# Patient Record
Sex: MALE | Race: WHITE | NOT HISPANIC OR LATINO | Employment: PART TIME
[De-identification: names, ages, dates, MRNs, and addresses within clinical notes are randomized per-mention and may not be internally consistent; named-entity substitution may affect disease eponyms.]

---

## 2021-08-08 ENCOUNTER — HEALTH MAINTENANCE LETTER (OUTPATIENT)
Age: 36
End: 2021-08-08

## 2021-10-04 ENCOUNTER — HEALTH MAINTENANCE LETTER (OUTPATIENT)
Age: 36
End: 2021-10-04

## 2021-11-03 ENCOUNTER — OFFICE VISIT (OUTPATIENT)
Dept: FAMILY MEDICINE | Facility: OTHER | Age: 36
End: 2021-11-03
Attending: NURSE PRACTITIONER
Payer: COMMERCIAL

## 2021-11-03 VITALS
DIASTOLIC BLOOD PRESSURE: 82 MMHG | RESPIRATION RATE: 18 BRPM | SYSTOLIC BLOOD PRESSURE: 122 MMHG | HEART RATE: 106 BPM | OXYGEN SATURATION: 97 % | HEIGHT: 67 IN | WEIGHT: 205.3 LBS | BODY MASS INDEX: 32.22 KG/M2 | TEMPERATURE: 98.4 F

## 2021-11-03 DIAGNOSIS — R42 VERTIGO: ICD-10-CM

## 2021-11-03 DIAGNOSIS — J34.89 NASAL PAIN: Primary | ICD-10-CM

## 2021-11-03 PROCEDURE — G0463 HOSPITAL OUTPT CLINIC VISIT: HCPCS

## 2021-11-03 PROCEDURE — 99203 OFFICE O/P NEW LOW 30 MIN: CPT | Performed by: NURSE PRACTITIONER

## 2021-11-03 RX ORDER — FLUOXETINE 40 MG/1
40 CAPSULE ORAL DAILY
COMMUNITY

## 2021-11-03 ASSESSMENT — MIFFLIN-ST. JEOR: SCORE: 1829.86

## 2021-11-03 ASSESSMENT — PAIN SCALES - GENERAL: PAINLEVEL: NO PAIN (0)

## 2021-11-03 NOTE — PROGRESS NOTES
ASSESSMENT/PLAN:     I have reviewed the nursing notes.  I have reviewed the findings, diagnosis, plan and need for follow up with the patient.      1. Nasal pain  Discussed with patient his symptoms are likely due to inflammation and dryness in his nares.  No clinical indications of sinus infection or indications for antibiotics at this time.  Recommend symptomatic treatment.  Symptomatic treatment - Encouraged fluids, saline nasal spray, humidifier, steamy shower, etc   If symptoms do not improve with recommended sypmtomatic treatment would recommend adding steroid nasal spray and lastly if needed then add antibiotics.      2. Vertigo    Right sided positional vertigo  Appears to have been single episode    Discussed warning signs/symptoms indicative of need to f/u  Follow up if symptoms persist or worsen or concerns      I explained my diagnostic considerations and recommendations to the patient, who voiced understanding and agreement with the treatment plan. All questions were answered. We discussed potential side effects of any prescribed or recommended therapies, as well as expectations for response to treatments.    Agatha Morrow NP  [facility]      SUBJECTIVE:   Nir Cosby is a 36 year old male who presents to clinic today for the following health issues:  Sinus    HPI  Sinus pressure and pain on right side of nose x 3 to 4 days.  No pain in cheek or forehead.  Dizziness last night when laying down, noted the room spinning and he was off balance.  Resting more today.  Minimal/slight dizziness today but no vertigo symptoms.  No fevers.  Minimal nasal drainage from right nostril only.  No headaches.  No ear pain, pressure, ringing or buzzing.  No sore throat.  No cough.  No shortness of breath.  Appetite fair.  Energy decreased, sleeping a lot today.  No vision change or loss.    No previous hx of vertigo  No OTC medications        Patient Active Problem List    Diagnosis Date Noted  "    Periumbilical hernia 12/03/2015     Priority: Medium     Seizure in infant (H) 12/03/2015     Priority: Medium     Pt was preemie, \" blue baby\"       Past Medical History:   Diagnosis Date     Seizures (H)     as a baby      Past Surgical History:   Procedure Laterality Date     HERNIORRHAPHY UMBILICAL N/A 12/4/2015    Procedure: HERNIORRHAPHY UMBILICAL;  Surgeon: Mario Hughes MD;  Location:  OR     Social History     Tobacco Use     Smoking status: Never Smoker     Smokeless tobacco: Never Used   Substance Use Topics     Alcohol use: Yes     Comment: 2-3 a week     Social History     Social History Narrative     Not on file     Current Outpatient Medications   Medication Sig Dispense Refill     FLUoxetine (PROZAC) 40 MG capsule Take 40 mg by mouth daily       Allergies   Allergen Reactions     Doxycycline Itching           OBJECTIVE:     /82   Pulse 106   Temp 98.4  F (36.9  C) (Tympanic)   Resp 18   Ht 1.702 m (5' 7\")   Wt 93.1 kg (205 lb 4.8 oz)   SpO2 97%   BMI 32.15 kg/m    Body mass index is 32.15 kg/m .     Physical Exam  General Appearance: Well appearing adult male, non ill appearance, appropriate appearance for age. No acute distress  Ears: Left TM intact, translucent with bony landmarks appreciated, no erythema, no effusion, no bulging, no purulence.  Right TM intact, translucent with bony landmarks appreciated, no erythema, no effusion, no bulging, no purulence.  Left auditory canal clear.  Right auditory canal clear.  Normal external ears, non tender.  No provoked vertigo symptoms with movement.  Eyes: conjunctivae normal without erythema or irritation, corneas clear, no drainage or crusting, no eyelid swelling, pupils equal   Orophayrnx: voice clear.    Sinuses:  No sinus tenderness upon palpation of the frontal or maxillary sinuses  Nose:  Bilateral nares with erythema and dryness, thick area of dried crusted congestion, no active drainage or bleeding  Neck: supple without " adenopathy  Respiratory: normal chest wall and respirations.  Normal effort.  No cough appreciated.  Musculoskeletal:  Normal ROM of neck without stiffness or discomfort.  qual movement of bilateral upper extremities.  Equal movement of bilateral lower extremities.  Normal gait.    Psychological: normal affect, alert, oriented, and pleasant.

## 2021-11-03 NOTE — NURSING NOTE
"Chief Complaint   Patient presents with     Sinus Problem     He has been having sinus pressure and pain has been going on for 3-4 days. He did have a bout of dizziness last night when he laid in bed.    Initial There were no vitals taken for this visit. Estimated body mass index is 27.19 kg/m  as calculated from the following:    Height as of 1/5/16: 1.727 m (5' 8\").    Weight as of 1/5/16: 81.1 kg (178 lb 12.8 oz).     FOOD SECURITY SCREENING QUESTIONS  Hunger Vital Signs:  Within the past 12 months we worried whether our food would run out before we got money to buy more. Never  Within the past 12 months the food we bought just didn't last and we didn't have money to get more. Never      Medication Reconciliation: Complete      Werner Bear LPN   "

## 2022-03-15 ENCOUNTER — OFFICE VISIT (OUTPATIENT)
Dept: FAMILY MEDICINE | Facility: OTHER | Age: 37
End: 2022-03-15
Attending: NURSE PRACTITIONER
Payer: COMMERCIAL

## 2022-03-15 VITALS
HEART RATE: 112 BPM | TEMPERATURE: 98.7 F | SYSTOLIC BLOOD PRESSURE: 126 MMHG | RESPIRATION RATE: 16 BRPM | DIASTOLIC BLOOD PRESSURE: 104 MMHG | BODY MASS INDEX: 32.12 KG/M2 | OXYGEN SATURATION: 97 % | WEIGHT: 205.1 LBS

## 2022-03-15 DIAGNOSIS — M62.89 MUSCLE TIGHTNESS: ICD-10-CM

## 2022-03-15 DIAGNOSIS — M54.41 ACUTE RIGHT-SIDED LOW BACK PAIN WITH RIGHT-SIDED SCIATICA: Primary | ICD-10-CM

## 2022-03-15 PROCEDURE — 99213 OFFICE O/P EST LOW 20 MIN: CPT | Performed by: NURSE PRACTITIONER

## 2022-03-15 PROCEDURE — G0463 HOSPITAL OUTPT CLINIC VISIT: HCPCS

## 2022-03-15 RX ORDER — CYCLOBENZAPRINE HCL 10 MG
10 TABLET ORAL 3 TIMES DAILY PRN
Qty: 10 TABLET | Refills: 0 | Status: SHIPPED | OUTPATIENT
Start: 2022-03-15

## 2022-03-15 RX ORDER — METHYLPREDNISOLONE 4 MG
TABLET, DOSE PACK ORAL
Qty: 21 TABLET | Refills: 0 | Status: SHIPPED | OUTPATIENT
Start: 2022-03-15

## 2022-03-15 ASSESSMENT — PAIN SCALES - GENERAL: PAINLEVEL: SEVERE PAIN (6)

## 2022-03-15 NOTE — NURSING NOTE
"Chief Complaint   Patient presents with     Hip Pain     right     Patient stated he has pain in his right hip area that radiates down the right leg. Been going on for a few weeks. Started feeling numbness today. Had a horrible cramp in right butt cheek yesterday. Has been using ice and tens unit. The hot shower seems to help while in it.    Initial BP (!) 126/104 (BP Location: Right arm, Patient Position: Sitting, Cuff Size: Adult Regular)   Pulse 112   Temp 98.7  F (37.1  C) (Tympanic)   Resp 16   Wt 93 kg (205 lb 1.6 oz)   SpO2 97%   BMI 32.12 kg/m   Estimated body mass index is 32.12 kg/m  as calculated from the following:    Height as of 11/3/21: 1.702 m (5' 7\").    Weight as of this encounter: 93 kg (205 lb 1.6 oz).  Medication Reconciliation: Completed     Advanced Care Directive Reviewed    Angus Loyola LPN  "

## 2022-03-15 NOTE — PROGRESS NOTES
ASSESSMENT/PLAN:    I have reviewed the nursing notes.  I have reviewed the findings, diagnosis, plan and need for follow up with the patient.    1. Acute right-sided low back pain with right-sided sciatica  - methylPREDNISolone (MEDROL DOSEPAK) 4 MG tablet therapy pack; Follow Package Directions  Dispense: 21 tablet; Refill: 0    2. Muscle tightness  - cyclobenzaprine (FLEXERIL) 10 MG tablet; Take 1 tablet (10 mg) by mouth 3 times daily as needed for muscle spasms  Dispense: 10 tablet; Refill: 0  -May use over-the-counter Tylenol or ibuprofen PRN  -May continue use of TENS unit, heat, ice, NSAIDs including ibuprofen and also Tylenol for pain.    I discussed with patient and his his significant other that symptoms and exam are most consistent with sciatic nerve pain following recent forward flexion and lifting mechanism of injury.   -Prescribed Medrol Dosepak for inflammation causing right sided sciatic nerve pain.  Recommend emergency room if he develops any spontaneous loss of bowel or bladder or if he is unable to ambulate or has a fever or chills.  At this time did not feel a low back x-ray was warranted but did did go over the possible causes of lumbar back pain with sciatica.    Follow up if symptoms persist or worsen or concerns    I explained my diagnostic considerations and recommendations to the patient, who voiced understanding and agreement with the treatment plan. All questions were answered. We discussed potential side effects of any prescribed or recommended therapies, as well as expectations for response to treatments.    Estrellita Fritz NP  3/15/2022  6:54 PM    HPI:  Nir Cosby is a 36 year old male who presents to [facility] today for concerns of pain in his right buttocks and hip area that radiates down the right leg. Been going on for a few weeks, worse in past couple of days following bending forward and lifting. Started feeling numbness on right thigh today. Had a horrible cramp in right  "butt cheek yesterday. Has been using ice and tens unit. The hot shower seems to help while in it.    Just yesterday he was going down to grab something and a muscle felt to have \"balled up\" and caused extreme discomfort to the site. No trauma / falls. Has been sitting a lot for working which is a huge change, but not new this is for past 2 years or so. Changed out his chair. Starts in the right gluteal area. Has been using TENS, massage, Has been taking ibuprofen 400 mg last night. Tumeric supplement anti-inflammatory lately too. Is on prozac. No loss of bowel or bladder. Some loss of strength in the leg.  Has previously tolerated Flexeril and took 1 last night which helped tremendously.    No fever/chills.     Past Medical History:   Diagnosis Date     Seizures (H)     as a baby     Past Surgical History:   Procedure Laterality Date     HERNIORRHAPHY UMBILICAL N/A 12/4/2015    Procedure: HERNIORRHAPHY UMBILICAL;  Surgeon: Mario Hughes MD;  Location:  OR     Social History     Tobacco Use     Smoking status: Never Smoker     Smokeless tobacco: Never Used   Substance Use Topics     Alcohol use: Yes     Comment: 2-3 a week     Current Outpatient Medications   Medication Sig Dispense Refill     cyclobenzaprine (FLEXERIL) 10 MG tablet Take 1 tablet (10 mg) by mouth 3 times daily as needed for muscle spasms 10 tablet 0     methylPREDNISolone (MEDROL DOSEPAK) 4 MG tablet therapy pack Follow Package Directions 21 tablet 0     FLUoxetine (PROZAC) 40 MG capsule Take 40 mg by mouth daily       Allergies   Allergen Reactions     Doxycycline Itching     Past medical history, past surgical history, current medications and allergies reviewed and accurate to the best of my knowledge.      ROS:  Refer to HPI    BP (!) 126/104 (BP Location: Right arm, Patient Position: Sitting, Cuff Size: Adult Regular)   Pulse 112   Temp 98.7  F (37.1  C) (Tympanic)   Resp 16   Wt 93 kg (205 lb 1.6 oz)   SpO2 97%   BMI 32.12 kg/m  "     EXAM:  General Appearance: Well appearing 36 year old male, appropriate appearance for age. No acute distress   Respiratory: No increased work of breathing.  No cough appreciated.  Musculoskeletal:  Equal movement of bilateral upper extremities.  Equal movement of bilateral lower extremities.  Normal gait.    Thoracic/Lumbar Spine:  Inspection: no ecchymosis, asymmetry, edema, erythema    Lordosis: Normal   Kyphosis: Normal  Tenderness: left para lumbar muscles, right para lumbar muscles. No spinal tenderness. No step offs.   ROM: lumbar flexion  full, lumbar extension  full, left lateral lumbar bending  full, right lateral lumbar bending  full, left lateral lumbar rotation  full, right lateral lumbar rotation  full  Strength: able to heel walk, able to toe walk    Psychological: normal affect, alert, oriented, and pleasant.

## 2022-03-16 NOTE — PATIENT INSTRUCTIONS
Recommend heat/ ice  Flexeril up to 3x per day as needed for tight muscles/spasms.     Prednisone dosepak    May take ibuprofen 600-800 as needed three times a day and tylenol 1000 mg as needed for pain until inflammation is improving.     The symptoms you are experiencing are consistent with right sided sciatica nerve pain.

## 2022-09-11 ENCOUNTER — HEALTH MAINTENANCE LETTER (OUTPATIENT)
Age: 37
End: 2022-09-11

## 2022-12-15 ENCOUNTER — NURSE TRIAGE (OUTPATIENT)
Dept: FAMILY MEDICINE | Facility: OTHER | Age: 37
End: 2022-12-15

## 2022-12-15 NOTE — TELEPHONE ENCOUNTER
Patient states he cut his finger on metal while changing a furnace filter. He would like to talk to a nurse.    Harriett Lopes on 12/15/2022 at 12:55 PM

## 2022-12-15 NOTE — TELEPHONE ENCOUNTER
Patient states he will present to [facility] for a tetanus booster within the next couple of days. Advice discussed per protocol recommendations. Patient instructed to call back if symptoms worsen/new symptoms develop. Coty Wilson RN on 12/15/2022 at 1:01 PM      Reason for Disposition    Last tetanus shot > 10 years ago and CLEAN cut    Additional Information    Negative: Major bleeding (e.g., actively dripping or spurting) and can't be stopped    Negative: Amputation    Negative: Shock suspected (e.g., cold/pale/clammy skin, too weak to stand, low BP, rapid pulse)    Negative: Knife wound (or other possibly deep cut) and to chest, abdomen, back, neck, or head    Negative: Self-injury (e.g., cutting, self-harm) and suicidal or out-of-control    Negative: Sounds like a life-threatening emergency to the triager    Negative: Animal bite and broken skin    Negative: Human bite and broken skin    Negative: Puncture wound    Negative: Bleeding and won't stop after 10 minutes of direct pressure (using correct technique)    Negative: Skin is split open or gaping (or length > 1/2 inch or 12 mm on the skin, 1/4 inch or 6 mm on the face)    Negative: Deep cut and can see bone or tendons    Negative: Cut over knuckle (MCP joint)    Negative: Sensation of something in the wound (i.e., retained object in wound)    Negative: Dirt in the wound and not removed with 15 minutes of scrubbing    Negative: Wound causes numbness (i.e., loss of sensation)    Negative: Wound causes weakness (i.e., decreased ability to move hand, finger, toe)    Negative: SEVERE pain and not improved 2 hours after pain medicine    Negative: Looks infected and large red area (> 2 inches or 5 cm) or streak    Negative: Fever and spreading red area or streak    Negative: Suspicious history for the injury    Negative: Looks infected (spreading redness, pus) and no fever    Negative: No prior tetanus shots (or is not fully vaccinated)    Negative: HIV  "positive or severe immunodeficiency (severely weak immune system) and DIRTY cut    Negative: Patient wants to be seen    Answer Assessment - Initial Assessment Questions  1. APPEARANCE of INJURY: \"What does the injury look like?\"       Closed today  2. SIZE: \"How large is the cut?\"       1/2\" long or so  3. BLEEDING: \"Is it bleeding now?\" If Yes, ask: \"Is it difficult to stop?\"       There was bleeding, allowed to bleed out and washed it  4. LOCATION: \"Where is the injury located?\"       Right hand, ring finger, pad of the finger  5. ONSET: \"How long ago did the injury occur?\"       yesterday  6. MECHANISM: \"Tell me how it happened.\"       Changing an air filter  7. TETANUS: \"When was the last tetanus booster?\"      >10 years ago  8. PREGNANCY: \"Is there any chance you are pregnant?\" \"When was your last menstrual period?\"      no    Protocols used: CUTS AND [id number]    "

## 2023-10-07 ENCOUNTER — HEALTH MAINTENANCE LETTER (OUTPATIENT)
Age: 38
End: 2023-10-07

## 2024-02-17 ENCOUNTER — OFFICE VISIT (OUTPATIENT)
Dept: FAMILY MEDICINE | Facility: OTHER | Age: 39
End: 2024-02-17
Payer: COMMERCIAL

## 2024-02-17 VITALS
OXYGEN SATURATION: 97 % | HEIGHT: 67 IN | RESPIRATION RATE: 16 BRPM | WEIGHT: 211.8 LBS | DIASTOLIC BLOOD PRESSURE: 82 MMHG | BODY MASS INDEX: 33.24 KG/M2 | HEART RATE: 104 BPM | SYSTOLIC BLOOD PRESSURE: 132 MMHG | TEMPERATURE: 97.7 F

## 2024-02-17 DIAGNOSIS — R30.0 DYSURIA: Primary | ICD-10-CM

## 2024-02-17 LAB
ALBUMIN UR-MCNC: 10 MG/DL
APPEARANCE UR: CLEAR
BILIRUB UR QL STRIP: NEGATIVE
C TRACH DNA SPEC QL PROBE+SIG AMP: NEGATIVE
COLOR UR AUTO: YELLOW
GLUCOSE UR STRIP-MCNC: NEGATIVE MG/DL
HGB UR QL STRIP: NEGATIVE
KETONES UR STRIP-MCNC: NEGATIVE MG/DL
LEUKOCYTE ESTERASE UR QL STRIP: NEGATIVE
N GONORRHOEA DNA SPEC QL NAA+PROBE: NEGATIVE
NITRATE UR QL: NEGATIVE
PH UR STRIP: 6 [PH] (ref 5–9)
RBC URINE: 1 /HPF
SP GR UR STRIP: 1.01 (ref 1–1.03)
UROBILINOGEN UR STRIP-MCNC: NORMAL MG/DL
WBC URINE: 1 /HPF

## 2024-02-17 PROCEDURE — 99213 OFFICE O/P EST LOW 20 MIN: CPT

## 2024-02-17 PROCEDURE — 87491 CHLMYD TRACH DNA AMP PROBE: CPT | Mod: ZL

## 2024-02-17 PROCEDURE — 81001 URINALYSIS AUTO W/SCOPE: CPT | Mod: ZL

## 2024-02-17 PROCEDURE — G0463 HOSPITAL OUTPT CLINIC VISIT: HCPCS

## 2024-02-17 ASSESSMENT — PAIN SCALES - GENERAL: PAINLEVEL: NO PAIN (0)

## 2024-02-17 NOTE — PROGRESS NOTES
ASSESSMENT/PLAN:    I have reviewed the nursing notes.  I have reviewed the findings, diagnosis, plan and need for follow up with the patient.    1. Dysuria  - UA with Microscopic reflex to Culture  - GC/Chlamydia by PCR    Patient presents with mild burning with urination and a white urethral discharge this morning.  Vitals are stable and patient appears nontoxic.  Urinalysis was negative for any signs of infection or other abnormalities.  GC/chlamydia testing was also negative.  Discussed with patient that his symptoms could be due to urethra irritation.  Advised patient push fluids May take Tylenol and ibuprofen as needed.  Advised patient that if his symptoms worsen or persist that he should be reevaluated, may need a referral to urology if symptoms persist.    Discussed warning signs/symptoms indicative of need to f/u    Follow up if symptoms persist or worsen or concerns    I explained my diagnostic considerations and recommendations to the patient, who voiced understanding and agreement with the treatment plan. All questions were answered. We discussed potential side effects of any prescribed or recommended therapies, as well as expectations for response to treatments.    KALANI Sheridan CNP  2/17/2024  10:12 AM    HPI:    Nir Cosby is a 38 year old male  who presents to [facility] today for concerns of urinary symptoms    Genitourinary - Male  Onset:  1 day(s) ago  Description:   Dysuria (painful): YES   Hematuria (blood in urine): No  Frequency: No  Are you urinating at night : No  Hesitancy (delay in urine): No  Retention (unable to empty): No  Decrease in urinary flow: No  Incontinence: No  Color of urine: yellow and clear  Accompanying Signs & Symptoms:   Fever: No  Back/Flank pain: No  Nausea and/or vomiting: No  Abdominal pain: No  Urethral discharge: YES- white  Testicle lumps/masses: No  History:    History of frequent UTI's: No  History of kidney stones: No  History of hernias: YES-  "umbilical  Personal or Family history of Prostate problems: No  Sexually active: YES  Any sexual dysfunction: no  No known STD exposure or past STDs - has been in a monogamous relationship for 8 years, female partner  Therapies tried:  none       Past Medical History:   Diagnosis Date    Seizures (H)     as a baby     Past Surgical History:   Procedure Laterality Date    HERNIORRHAPHY UMBILICAL N/A 12/4/2015    Procedure: HERNIORRHAPHY UMBILICAL;  Surgeon: Mario Hughes MD;  Location:  OR     Social History     Tobacco Use    Smoking status: Never     Passive exposure: Past    Smokeless tobacco: Never   Substance Use Topics    Alcohol use: Yes     Comment: 2-3 a week     Current Outpatient Medications   Medication Sig Dispense Refill    cyclobenzaprine (FLEXERIL) 10 MG tablet Take 1 tablet (10 mg) by mouth 3 times daily as needed for muscle spasms 10 tablet 0    FLUoxetine (PROZAC) 40 MG capsule Take 40 mg by mouth daily      methylPREDNISolone (MEDROL DOSEPAK) 4 MG tablet therapy pack Follow Package Directions 21 tablet 0     Allergies   Allergen Reactions    Doxycycline Itching     Past medical history, past surgical history, current medications and allergies reviewed and accurate to the best of my knowledge.      ROS:  Refer to HPI    /82 (BP Location: Right arm, Patient Position: Chair, Cuff Size: Adult Large)   Pulse 104   Temp 97.7  F (36.5  C) (Tympanic)   Resp 16   Ht 1.702 m (5' 7\")   Wt 96.1 kg (211 lb 12.8 oz)   SpO2 97%   BMI 33.17 kg/m      EXAM:  General Appearance: Well appearing 38 year old male, appropriate appearance for age. No acute distress   Respiratory: normal chest wall and respirations.  Normal effort.  Clear to auscultation bilaterally, no wheezing, crackles or rhonchi.  No increased work of breathing.  No cough appreciated.  Cardiac: RRR with no murmurs  :  No suprapubic tenderness to palpation.  Absent CVA tenderness to palpation.    Musculoskeletal:  Equal " movement of bilateral upper extremities.  Equal movement of bilateral lower extremities.  Normal gait.    Dermatological: no rashes noted of exposed skin  Neuro: Alert and oriented to person, place, and time.    Psychological: normal affect, alert, oriented, and pleasant.     Labs:  Results for orders placed or performed in visit on 02/17/24   UA with Microscopic reflex to Culture     Status: Abnormal    Specimen: Urine, Midstream   Result Value Ref Range    Color Urine Yellow Colorless, Straw, Light Yellow, Yellow    Appearance Urine Clear Clear    Glucose Urine Negative Negative mg/dL    Bilirubin Urine Negative Negative    Ketones Urine Negative Negative mg/dL    Specific Gravity Urine 1.008 1.000 - 1.030    Blood Urine Negative Negative    pH Urine 6.0 5.0 - 9.0    Protein Albumin Urine 10 (A) Negative mg/dL    Urobilinogen Urine Normal Normal, 2.0 mg/dL    Nitrite Urine Negative Negative    Leukocyte Esterase Urine Negative Negative    RBC Urine 1 <=2 /HPF    WBC Urine 1 <=5 /HPF    Narrative    Urine Culture not indicated   GC/Chlamydia by PCR     Status: Normal    Specimen: Urine, Voided   Result Value Ref Range    Chlamydia Trachomatis Negative Negative    Neisseria gonorrhoeae Negative Negative    Narrative    Assay performed using [employer] real-time, reverse-transcriptase PCR.

## 2024-02-17 NOTE — NURSING NOTE
"Chief Complaint   Patient presents with    Dysuria     X 2 Days        Initial /82 (BP Location: Right arm, Patient Position: Chair, Cuff Size: Adult Large)   Pulse 104   Temp 97.7  F (36.5  C) (Tympanic)   Resp 16   Ht 1.702 m (5' 7\")   Wt 96.1 kg (211 lb 12.8 oz)   SpO2 97%   BMI 33.17 kg/m   Estimated body mass index is 33.17 kg/m  as calculated from the following:    Height as of this encounter: 1.702 m (5' 7\").    Weight as of this encounter: 96.1 kg (211 lb 12.8 oz).    FOOD SECURITY SCREENING QUESTIONS:    The next two questions are to help us understand your food security.  If you are feeling you need any assistance in this area, we have resources available to support you today.    Hunger Vital Signs:  Within the past 12 months we worried whether our food would run out before we got money to buy more. Never  Within the past 12 months the food we bought just didn't last and we didn't have money to get more. Never    Medication Reconciliation: Complete.       Yesica Rangel LPN on 2/17/2024 at 9:57 AM     "

## 2024-02-27 ENCOUNTER — NURSE TRIAGE (OUTPATIENT)
Dept: FAMILY MEDICINE | Facility: OTHER | Age: 39
End: 2024-02-27
Payer: COMMERCIAL

## 2024-02-27 ENCOUNTER — OFFICE VISIT (OUTPATIENT)
Dept: FAMILY MEDICINE | Facility: OTHER | Age: 39
End: 2024-02-27
Attending: NURSE PRACTITIONER
Payer: COMMERCIAL

## 2024-02-27 VITALS
HEIGHT: 68 IN | OXYGEN SATURATION: 97 % | WEIGHT: 212.3 LBS | BODY MASS INDEX: 32.18 KG/M2 | DIASTOLIC BLOOD PRESSURE: 92 MMHG | SYSTOLIC BLOOD PRESSURE: 122 MMHG | RESPIRATION RATE: 16 BRPM | TEMPERATURE: 98.6 F | HEART RATE: 108 BPM

## 2024-02-27 DIAGNOSIS — R36.9 PENILE DISCHARGE: ICD-10-CM

## 2024-02-27 LAB
ALBUMIN UR-MCNC: 10 MG/DL
ANION GAP SERPL CALCULATED.3IONS-SCNC: 11 MMOL/L (ref 7–15)
APPEARANCE UR: CLEAR
BASOPHILS # BLD AUTO: 0 10E3/UL (ref 0–0.2)
BASOPHILS NFR BLD AUTO: 1 %
BILIRUB UR QL STRIP: NEGATIVE
BUN SERPL-MCNC: 10.4 MG/DL (ref 6–20)
C TRACH DNA SPEC QL PROBE+SIG AMP: NEGATIVE
CALCIUM SERPL-MCNC: 10 MG/DL (ref 8.6–10)
CHLORIDE SERPL-SCNC: 103 MMOL/L (ref 98–107)
COLOR UR AUTO: ABNORMAL
CREAT SERPL-MCNC: 0.9 MG/DL (ref 0.67–1.17)
DEPRECATED HCO3 PLAS-SCNC: 26 MMOL/L (ref 22–29)
EGFRCR SERPLBLD CKD-EPI 2021: >90 ML/MIN/1.73M2
EOSINOPHIL # BLD AUTO: 0.2 10E3/UL (ref 0–0.7)
EOSINOPHIL NFR BLD AUTO: 3 %
ERYTHROCYTE [DISTWIDTH] IN BLOOD BY AUTOMATED COUNT: 12.1 % (ref 10–15)
GLUCOSE SERPL-MCNC: 91 MG/DL (ref 70–99)
GLUCOSE UR STRIP-MCNC: NEGATIVE MG/DL
HCT VFR BLD AUTO: 45.6 % (ref 40–53)
HGB BLD-MCNC: 15.8 G/DL (ref 13.3–17.7)
HGB UR QL STRIP: NEGATIVE
IMM GRANULOCYTES # BLD: 0 10E3/UL
IMM GRANULOCYTES NFR BLD: 0 %
KETONES UR STRIP-MCNC: NEGATIVE MG/DL
LEUKOCYTE ESTERASE UR QL STRIP: NEGATIVE
LYMPHOCYTES # BLD AUTO: 2.1 10E3/UL (ref 0.8–5.3)
LYMPHOCYTES NFR BLD AUTO: 29 %
MCH RBC QN AUTO: 28.9 PG (ref 26.5–33)
MCHC RBC AUTO-ENTMCNC: 34.6 G/DL (ref 31.5–36.5)
MCV RBC AUTO: 84 FL (ref 78–100)
MONOCYTES # BLD AUTO: 0.7 10E3/UL (ref 0–1.3)
MONOCYTES NFR BLD AUTO: 10 %
MUCOUS THREADS #/AREA URNS LPF: PRESENT /LPF
N GONORRHOEA DNA SPEC QL NAA+PROBE: NEGATIVE
NEUTROPHILS # BLD AUTO: 4.2 10E3/UL (ref 1.6–8.3)
NEUTROPHILS NFR BLD AUTO: 58 %
NITRATE UR QL: NEGATIVE
NRBC # BLD AUTO: 0 10E3/UL
NRBC BLD AUTO-RTO: 0 /100
PH UR STRIP: 6.5 [PH] (ref 5–9)
PLATELET # BLD AUTO: 264 10E3/UL (ref 150–450)
POTASSIUM SERPL-SCNC: 4.5 MMOL/L (ref 3.4–5.3)
RBC # BLD AUTO: 5.46 10E6/UL (ref 4.4–5.9)
RBC URINE: 2 /HPF
SODIUM SERPL-SCNC: 140 MMOL/L (ref 135–145)
SP GR UR STRIP: 1.01 (ref 1–1.03)
T VAGINALIS DNA SPEC QL NAA+PROBE: NOT DETECTED
UROBILINOGEN UR STRIP-MCNC: NORMAL MG/DL
WBC # BLD AUTO: 7.2 10E3/UL (ref 4–11)
WBC URINE: 0 /HPF

## 2024-02-27 PROCEDURE — 85025 COMPLETE CBC W/AUTO DIFF WBC: CPT | Mod: ZL | Performed by: NURSE PRACTITIONER

## 2024-02-27 PROCEDURE — 81003 URINALYSIS AUTO W/O SCOPE: CPT | Mod: ZL

## 2024-02-27 PROCEDURE — G0463 HOSPITAL OUTPT CLINIC VISIT: HCPCS

## 2024-02-27 PROCEDURE — 81001 URINALYSIS AUTO W/SCOPE: CPT | Mod: ZL | Performed by: NURSE PRACTITIONER

## 2024-02-27 PROCEDURE — 80048 BASIC METABOLIC PNL TOTAL CA: CPT | Mod: ZL | Performed by: NURSE PRACTITIONER

## 2024-02-27 PROCEDURE — 36415 COLL VENOUS BLD VENIPUNCTURE: CPT | Mod: ZL | Performed by: NURSE PRACTITIONER

## 2024-02-27 PROCEDURE — 99213 OFFICE O/P EST LOW 20 MIN: CPT | Performed by: NURSE PRACTITIONER

## 2024-02-27 PROCEDURE — 87661 TRICHOMONAS VAGINALIS AMPLIF: CPT | Mod: ZL | Performed by: NURSE PRACTITIONER

## 2024-02-27 PROCEDURE — 87205 SMEAR GRAM STAIN: CPT | Mod: ZL | Performed by: NURSE PRACTITIONER

## 2024-02-27 PROCEDURE — 86780 TREPONEMA PALLIDUM: CPT | Mod: ZL | Performed by: NURSE PRACTITIONER

## 2024-02-27 PROCEDURE — 87491 CHLMYD TRACH DNA AMP PROBE: CPT | Mod: ZL | Performed by: NURSE PRACTITIONER

## 2024-02-27 RX ORDER — SULFAMETHOXAZOLE/TRIMETHOPRIM 800-160 MG
1 TABLET ORAL 2 TIMES DAILY
Qty: 20 TABLET | Refills: 0 | Status: SHIPPED | OUTPATIENT
Start: 2024-02-27 | End: 2024-03-08

## 2024-02-27 ASSESSMENT — ENCOUNTER SYMPTOMS
FLANK PAIN: 0
DIFFICULTY URINATING: 0
ACTIVITY CHANGE: 0
NAUSEA: 0
CHILLS: 0
FREQUENCY: 0
DYSURIA: 1
FEVER: 0
COLOR CHANGE: 0
WOUND: 0
VOMITING: 0
APPETITE CHANGE: 0
HEMATURIA: 0
DIAPHORESIS: 0
FATIGUE: 1

## 2024-02-27 ASSESSMENT — PAIN SCALES - GENERAL: PAINLEVEL: MILD PAIN (2)

## 2024-02-27 NOTE — TELEPHONE ENCOUNTER
S-(situation): Patient has been experiencing white discharge for the past 10 days.    B-(background): [facility], no diagnosis noted.    A-(assessment): Patient reports white discharge continuing for past 10 days. Reports not having control of when it shows up, sometimes wakes up and its there. Denies any flank pain, fevers, painful urination. Wife and patient are currently attempting to get pregnant and is wondering if they need to stop trying for right now. Patient does not have a PCP here or anywhere at this time.    R-(recommendations): per protocol patient wants to be seen in clinic. Patient verbalized understanding and agreed to send this to a provider for review.    Alexandra Greene RN on 2/27/2024 at 10:00 AM      Reason for Disposition    Patient wants to be seen    Additional Information    Negative: Shock suspected (e.g., cold/pale/clammy skin, too weak to stand, low BP, rapid pulse)    Negative: Sounds like a life-threatening emergency to the triager    Negative: Followed a female genital area injury (e.g., labia, vagina, vulva)    Negative: Followed a male genital area injury (penis, scrotum)    Negative: Vaginal discharge    Negative: Pus (white, yellow) or bloody discharge from end of penis    Negative: Pain or burning with passing urine (urination) and pregnant    Negative: Pain or burning with passing urine (urination) and female    Negative: Pain or burning with passing urine (urination) and male    Negative: Pain or itching in the vulvar area    Negative: Pain in scrotum is main symptom    Negative: Blood in the urine is main symptom    Negative: Symptoms arising from use of a urinary catheter (e.g., Coude, Cox)    Negative: Unable to urinate (or only a few drops) > 4 hours and bladder feels very full (e.g., palpable bladder or strong urge to urinate)    Negative: Decreased urination and drinking very little and dehydration suspected (e.g., dark urine, no urine > 12 hours, very  "dry mouth, very lightheaded)    Negative: Patient sounds very sick or weak to the triager    Negative: Fever > 100.4 F  (38.0 C)    Negative: Side (flank) or lower back pain present    Negative: Bad or foul-smelling urine    Negative: Urinating more frequently than usual (i.e., frequency)    Negative: Can't control passage of urine (i.e., urinary incontinence) and new-onset (< 2 weeks) or worsening    Answer Assessment - Initial Assessment Questions  1. SYMPTOM: \"What's the main symptom you're concerned about?\" (e.g., frequency, incontinence)      Discharge- a white color  2. ONSET: \"When did the discharge start?\"      10 days ago  3. PAIN: \"Is there any pain?\" If Yes, ask: \"How bad is it?\" (Scale: 1-10; mild, moderate, severe)      None  4. CAUSE: \"What do you think is causing the symptoms?\"      Unsure  5. OTHER SYMPTOMS: \"Do you have any other symptoms?\" (e.g., blood in urine, fever, flank pain, pain with urination)      Discomfort with urination.    Protocols used: Urinary Symptoms-A-OH    "

## 2024-02-27 NOTE — PROGRESS NOTES
Nir Cosby  1985    ASSESSMENT/PLAN:   1. Penile discharge  - GC/Chlamydia by PCR  - UA Macroscopic with reflex to Microscopic and Culture  - Swab Aerobic Bacterial Culture Routine With Gram Stain  - Trichomonas vaginalis by PCR  - CBC and Differential  - Basic Metabolic Panel  - Treponema Ab w Reflex to RPR and Titer    Patient has had 10 days of consistent white milky penile discharge associated with slight dysuria with urination.  Discharge is present each morning with small amounts throughout the day.  No other systemic signs of illness or infection.  Physical exam today is reassuring, no acute findings on physical exam.  Urinalysis returned negative for nitrates, leukocyte esterase or hematuria  Chlamydia gonorrhea- negative  CBC returned without acute findings, WBC 7.2.  BMP returned with stable electrolytes and kidney function, creatinine 0.9, GFR greater than 90.  Trichomonas and syphilis are still processing  Penile wound culture obtained processing, preliminary culture is negative.    With patient's travel to [place], and persistent symptoms for the past 10 days I do think empirically covering for bacterial infection is reasonable.  He has no known drug allergies.  Prescription for Bactrim twice daily for 10 days sent to pharmacy for him to start tomorrow morning.  If symptoms are still continuing after antibiotic treatment I recommend returning for further evaluation.    Patient agrees with plan of care and verbalizes understating. AVS printed. Patient education provided verbally and written instructions provided as requested. Patient made aware of emergent signs and symptoms to monitor for and when to seek additional care/follow up.     SUBJECTIVE:   CHIEF COMPLAINT/ REASON FOR VISIT  Patient presents with:  Urinary Problem     HISTORY OF PRESENT ILLNESS  Nir Cosby is a pleasant 38 year old male presents to rapid clinic today with concerns of ongoing penile drainage.  History provided by  "patient.  Patient reports a ten day history of white-milky penile discharge, reports that it is not sticky or curd-like. This is most present when waking in the morning, but notices dime-size spots in the underwear. The discharge during the day seems to have decreased from initial start of the condition. Mild fatigue. Mild pain (2/10) with urination. Pruritus to head of penis at times. Patient was tested on day one of symptoms for gonorrhea, chlamydia, as well as a UTI which were all negative at that time. Patient reports having the same female partner for eight year. Partner has not had any known gynecological changes.  Patient denies fever, chills, night sweats.  No penile lesions. No history of diabetes. Patient reports vacationing in [place] 1/24/24-2/5/24, recalls [place] area and shortly after that had an outer ear infection.    I have reviewed the nursing notes.  I have reviewed allergies, medication list, problem list, and past medical history.    REVIEW OF SYSTEMS  Review of Systems   Constitutional:  Positive for fatigue. Negative for activity change, appetite change, chills, diaphoresis and fever.   Gastrointestinal:  Negative for nausea and vomiting.   Genitourinary:  Positive for dysuria and penile discharge. Negative for decreased urine volume, difficulty urinating, flank pain, frequency, genital sores, hematuria, penile pain, penile swelling, scrotal swelling, testicular pain and urgency.   Skin:  Negative for color change, pallor, rash and wound.      VITAL SIGNS  Vitals:    02/27/24 1210   BP: (!) 122/92   BP Location: Right arm   Patient Position: Sitting   Cuff Size: Adult Large   Pulse: 108   Resp: 16   Temp: 98.6  F (37  C)   TempSrc: Tympanic   SpO2: 97%   Weight: 96.3 kg (212 lb 4.8 oz)   Height: 1.715 m (5' 7.5\")      Body mass index is 32.76 kg/m .    OBJECTIVE:   PHYSICAL EXAM  Physical Exam  Vitals and nursing note reviewed. Exam conducted with a chaperone present. "   Constitutional:       Appearance: Normal appearance.   HENT:      Head: Normocephalic.   Cardiovascular:      Rate and Rhythm: Normal rate and regular rhythm.   Pulmonary:      Effort: Pulmonary effort is normal.      Breath sounds: Normal breath sounds.   Abdominal:      General: There is no distension.      Palpations: Abdomen is soft.      Tenderness: There is no abdominal tenderness.      Hernia: There is no hernia in the left inguinal area or right inguinal area.   Genitourinary:     Pubic Area: No rash.       Penis: Normal and circumcised. No erythema, tenderness, discharge, swelling or lesions.       Testes: Normal.      Epididymis:      Right: Normal.      Left: Normal.      Comments: Normal penile and testicular exam.    No edema, erythema, rash, warmth or tenderness on exam.  Penile meatus nontender, non-erythematous, minimal clear milky discharge visualized.  Lymphadenopathy:      Lower Body: No right inguinal adenopathy. No left inguinal adenopathy.   Skin:     General: Skin is warm and dry.   Neurological:      Mental Status: He is alert.        DIAGNOSTICS  Results for orders placed or performed in visit on 02/27/24   UA Macroscopic with reflex to Microscopic and Culture     Status: Abnormal    Specimen: Urine, Midstream   Result Value Ref Range    Color Urine Light Yellow Colorless, Straw, Light Yellow, Yellow    Appearance Urine Clear Clear    Glucose Urine Negative Negative mg/dL    Bilirubin Urine Negative Negative    Ketones Urine Negative Negative mg/dL    Specific Gravity Urine 1.011 1.000 - 1.030    Blood Urine Negative Negative    pH Urine 6.5 5.0 - 9.0    Protein Albumin Urine 10 (A) Negative mg/dL    Urobilinogen Urine Normal Normal, 2.0 mg/dL    Nitrite Urine Negative Negative    Leukocyte Esterase Urine Negative Negative    Mucus Urine Present (A) None Seen /LPF    RBC Urine 2 <=2 /HPF    WBC Urine 0 <=5 /HPF    Narrative    Urine Culture not indicated   Basic Metabolic Panel     Status:  Normal   Result Value Ref Range    Sodium 140 135 - 145 mmol/L    Potassium 4.5 3.4 - 5.3 mmol/L    Chloride 103 98 - 107 mmol/L    Carbon Dioxide (CO2) 26 22 - 29 mmol/L    Anion Gap 11 7 - 15 mmol/L    Urea Nitrogen 10.4 6.0 - 20.0 mg/dL    Creatinine 0.90 0.67 - 1.17 mg/dL    GFR Estimate >90 >60 mL/min/1.73m2    Calcium 10.0 8.6 - 10.0 mg/dL    Glucose 91 70 - 99 mg/dL   CBC with platelets and differential     Status: None   Result Value Ref Range    WBC Count 7.2 4.0 - 11.0 10e3/uL    RBC Count 5.46 4.40 - 5.90 10e6/uL    Hemoglobin 15.8 13.3 - 17.7 g/dL    Hematocrit 45.6 40.0 - 53.0 %    MCV 84 78 - 100 fL    MCH 28.9 26.5 - 33.0 pg    MCHC 34.6 31.5 - 36.5 g/dL    RDW 12.1 10.0 - 15.0 %    Platelet Count 264 150 - 450 10e3/uL    % Neutrophils 58 %    % Lymphocytes 29 %    % Monocytes 10 %    % Eosinophils 3 %    % Basophils 1 %    % Immature Granulocytes 0 %    NRBCs per 100 WBC 0 <1 /100    Absolute Neutrophils 4.2 1.6 - 8.3 10e3/uL    Absolute Lymphocytes 2.1 0.8 - 5.3 10e3/uL    Absolute Monocytes 0.7 0.0 - 1.3 10e3/uL    Absolute Eosinophils 0.2 0.0 - 0.7 10e3/uL    Absolute Basophils 0.0 0.0 - 0.2 10e3/uL    Absolute Immature Granulocytes 0.0 <=0.4 10e3/uL    Absolute NRBCs 0.0 10e3/uL   GC/Chlamydia by PCR     Status: Normal    Specimen: Urine, Voided   Result Value Ref Range    Chlamydia Trachomatis Negative Negative    Neisseria gonorrhoeae Negative Negative    Narrative    Assay performed using [employer] real-time, reverse-transcriptase PCR.   Swab Aerobic Bacterial Culture Routine With Gram Stain     Status: None (Preliminary result)    Specimen: Penis; Swab   Result Value Ref Range    Gram Stain Result No PMNs seen     Gram Stain Result No organisms seen    CBC and Differential     Status: None    Narrative    The following orders were created for panel order CBC and Differential.  Procedure                               Abnormality         Status                     ---------                                -----------         ------                     CBC with platelets and d...[[id number]]                      Final result                 Please view results for these tests on the individual orders.      Huan Terry NP Student with [employer]    Marnie Stewart NP  [facility]

## 2024-02-27 NOTE — NURSING NOTE
"Pt presents to  for discharge during urination. Pt was here on 2/17/24 and all testing returned negative. Pt states he was recently in the ocean in [place] and in several swimming pools.  Pt states him and his partner have recently started trying for a child - he has questions regarding this. Should they pause until this issue is resolved?  Pt states he has also has fatigue, somewhat loss of appetite, and rates pain during urination 2/10.    Chief Complaint   Patient presents with    Urinary Problem       FOOD SECURITY SCREENING QUESTIONS  Hunger Vital Signs:  Within the past 12 months we worried whether our food would run out before we got money to buy more. Never  Within the past 12 months the food we bought just didn't last and we didn't have money to get more. Never  Aracelis Smith 2/27/2024 12:13 PM      Initial BP (!) 122/92 (BP Location: Right arm, Patient Position: Sitting, Cuff Size: Adult Large)   Pulse 108   Temp 98.6  F (37  C) (Tympanic)   Resp 16   Ht 1.715 m (5' 7.5\")   Wt 96.3 kg (212 lb 4.8 oz)   SpO2 97%   BMI 32.76 kg/m   Estimated body mass index is 32.76 kg/m  as calculated from the following:    Height as of this encounter: 1.715 m (5' 7.5\").    Weight as of this encounter: 96.3 kg (212 lb 4.8 oz).  Medication Reconciliation: complete    Aracelis Smith  "

## 2024-02-27 NOTE — TELEPHONE ENCOUNTER
Abnormal discharge when urinating would like a call back from a nurse to see if he needs to be seen again was seen 10 days ago    Please call and advise    Thank You    Raegan Mckeon on 2/27/2024 at 9:44 AM

## 2024-02-27 NOTE — TELEPHONE ENCOUNTER
Called and spoke to Patient after verifying last name and date of birth. Relayed message from covering provider:   Agree with recommendation, recommend reevaluation in rapid clinic.   Patient verbalized understanding and agreed with recommendation.        Alexandra Greene RN on 2/27/2024 at 10:26 AM

## 2024-02-28 LAB — T PALLIDUM AB SER QL: NONREACTIVE

## 2024-02-29 LAB
BACTERIA SPEC CULT: NORMAL
GRAM STAIN RESULT: NORMAL
GRAM STAIN RESULT: NORMAL

## 2025-04-10 ENCOUNTER — OFFICE VISIT (OUTPATIENT)
Dept: FAMILY MEDICINE | Facility: OTHER | Age: 40
End: 2025-04-10
Attending: FAMILY MEDICINE
Payer: COMMERCIAL

## 2025-04-10 VITALS
RESPIRATION RATE: 20 BRPM | HEIGHT: 68 IN | WEIGHT: 218 LBS | OXYGEN SATURATION: 96 % | DIASTOLIC BLOOD PRESSURE: 78 MMHG | BODY MASS INDEX: 33.04 KG/M2 | SYSTOLIC BLOOD PRESSURE: 126 MMHG | HEART RATE: 120 BPM | TEMPERATURE: 98.5 F

## 2025-04-10 DIAGNOSIS — Z00.00 ROUTINE HISTORY AND PHYSICAL EXAMINATION OF ADULT: Primary | ICD-10-CM

## 2025-04-10 DIAGNOSIS — F32.A DEPRESSION, UNSPECIFIED DEPRESSION TYPE: ICD-10-CM

## 2025-04-10 DIAGNOSIS — Z23 NEED FOR TDAP VACCINATION: ICD-10-CM

## 2025-04-10 LAB
ALBUMIN SERPL BCG-MCNC: 4.7 G/DL (ref 3.5–5.2)
ALP SERPL-CCNC: 95 U/L (ref 40–150)
ALT SERPL W P-5'-P-CCNC: 26 U/L (ref 0–70)
ANION GAP SERPL CALCULATED.3IONS-SCNC: 10 MMOL/L (ref 7–15)
AST SERPL W P-5'-P-CCNC: 35 U/L (ref 0–45)
BASOPHILS # BLD AUTO: 0.1 10E3/UL (ref 0–0.2)
BASOPHILS NFR BLD AUTO: 1 %
BILIRUB SERPL-MCNC: 0.3 MG/DL
BUN SERPL-MCNC: 13.2 MG/DL (ref 6–20)
CALCIUM SERPL-MCNC: 9.9 MG/DL (ref 8.8–10.4)
CHLORIDE SERPL-SCNC: 103 MMOL/L (ref 98–107)
CHOLEST SERPL-MCNC: 287 MG/DL
CREAT SERPL-MCNC: 0.94 MG/DL (ref 0.67–1.17)
EGFRCR SERPLBLD CKD-EPI 2021: >90 ML/MIN/1.73M2
EOSINOPHIL # BLD AUTO: 0.2 10E3/UL (ref 0–0.7)
EOSINOPHIL NFR BLD AUTO: 3 %
ERYTHROCYTE [DISTWIDTH] IN BLOOD BY AUTOMATED COUNT: 12.1 % (ref 10–15)
EST. AVERAGE GLUCOSE BLD GHB EST-MCNC: 108 MG/DL
FASTING STATUS PATIENT QL REPORTED: ABNORMAL
FASTING STATUS PATIENT QL REPORTED: NORMAL
GLUCOSE SERPL-MCNC: 88 MG/DL (ref 70–99)
HBA1C MFR BLD: 5.4 %
HCO3 SERPL-SCNC: 27 MMOL/L (ref 22–29)
HCT VFR BLD AUTO: 46.5 % (ref 40–53)
HDLC SERPL-MCNC: 43 MG/DL
HGB BLD-MCNC: 15.9 G/DL (ref 13.3–17.7)
IMM GRANULOCYTES # BLD: 0 10E3/UL
IMM GRANULOCYTES NFR BLD: 0 %
LDLC SERPL CALC-MCNC: ABNORMAL MG/DL
LYMPHOCYTES # BLD AUTO: 2.4 10E3/UL (ref 0.8–5.3)
LYMPHOCYTES NFR BLD AUTO: 31 %
MCH RBC QN AUTO: 28.6 PG (ref 26.5–33)
MCHC RBC AUTO-ENTMCNC: 34.2 G/DL (ref 31.5–36.5)
MCV RBC AUTO: 84 FL (ref 78–100)
MONOCYTES # BLD AUTO: 1 10E3/UL (ref 0–1.3)
MONOCYTES NFR BLD AUTO: 14 %
NEUTROPHILS # BLD AUTO: 3.9 10E3/UL (ref 1.6–8.3)
NEUTROPHILS NFR BLD AUTO: 51 %
NONHDLC SERPL-MCNC: 244 MG/DL
NRBC # BLD AUTO: 0 10E3/UL
NRBC BLD AUTO-RTO: 0 /100
PLATELET # BLD AUTO: 315 10E3/UL (ref 150–450)
POTASSIUM SERPL-SCNC: 4.4 MMOL/L (ref 3.4–5.3)
PROT SERPL-MCNC: 7.7 G/DL (ref 6.4–8.3)
RBC # BLD AUTO: 5.55 10E6/UL (ref 4.4–5.9)
SODIUM SERPL-SCNC: 140 MMOL/L (ref 135–145)
TRIGL SERPL-MCNC: 407 MG/DL
TSH SERPL DL<=0.005 MIU/L-ACNC: 1.37 UIU/ML (ref 0.3–4.2)
WBC # BLD AUTO: 7.6 10E3/UL (ref 4–11)

## 2025-04-10 PROCEDURE — 90471 IMMUNIZATION ADMIN: CPT | Performed by: FAMILY MEDICINE

## 2025-04-10 PROCEDURE — 3078F DIAST BP <80 MM HG: CPT | Performed by: FAMILY MEDICINE

## 2025-04-10 PROCEDURE — 84155 ASSAY OF PROTEIN SERUM: CPT | Mod: ZL | Performed by: FAMILY MEDICINE

## 2025-04-10 PROCEDURE — 83036 HEMOGLOBIN GLYCOSYLATED A1C: CPT | Mod: ZL | Performed by: FAMILY MEDICINE

## 2025-04-10 PROCEDURE — 83718 ASSAY OF LIPOPROTEIN: CPT | Mod: ZL | Performed by: FAMILY MEDICINE

## 2025-04-10 PROCEDURE — 85004 AUTOMATED DIFF WBC COUNT: CPT | Mod: ZL | Performed by: FAMILY MEDICINE

## 2025-04-10 PROCEDURE — 82306 VITAMIN D 25 HYDROXY: CPT | Mod: ZL | Performed by: FAMILY MEDICINE

## 2025-04-10 PROCEDURE — 84443 ASSAY THYROID STIM HORMONE: CPT | Mod: ZL | Performed by: FAMILY MEDICINE

## 2025-04-10 PROCEDURE — 82465 ASSAY BLD/SERUM CHOLESTEROL: CPT | Mod: ZL | Performed by: FAMILY MEDICINE

## 2025-04-10 PROCEDURE — 99395 PREV VISIT EST AGE 18-39: CPT | Mod: 25 | Performed by: FAMILY MEDICINE

## 2025-04-10 PROCEDURE — 99213 OFFICE O/P EST LOW 20 MIN: CPT | Mod: 25 | Performed by: FAMILY MEDICINE

## 2025-04-10 PROCEDURE — 90715 TDAP VACCINE 7 YRS/> IM: CPT | Performed by: FAMILY MEDICINE

## 2025-04-10 PROCEDURE — 3074F SYST BP LT 130 MM HG: CPT | Performed by: FAMILY MEDICINE

## 2025-04-10 PROCEDURE — 36415 COLL VENOUS BLD VENIPUNCTURE: CPT | Mod: ZL | Performed by: FAMILY MEDICINE

## 2025-04-10 PROCEDURE — 82374 ASSAY BLOOD CARBON DIOXIDE: CPT | Mod: ZL | Performed by: FAMILY MEDICINE

## 2025-04-10 PROCEDURE — 1126F AMNT PAIN NOTED NONE PRSNT: CPT | Performed by: FAMILY MEDICINE

## 2025-04-10 SDOH — HEALTH STABILITY: PHYSICAL HEALTH: ON AVERAGE, HOW MANY DAYS PER WEEK DO YOU ENGAGE IN MODERATE TO STRENUOUS EXERCISE (LIKE A BRISK WALK)?: 4 DAYS

## 2025-04-10 ASSESSMENT — PAIN SCALES - GENERAL: PAINLEVEL_OUTOF10: NO PAIN (0)

## 2025-04-10 ASSESSMENT — SOCIAL DETERMINANTS OF HEALTH (SDOH): HOW OFTEN DO YOU GET TOGETHER WITH FRIENDS OR RELATIVES?: TWICE A WEEK

## 2025-04-10 NOTE — PROGRESS NOTES
Preventive Care Visit  [facility]  Camelia Valente DO, Family Medicine  Apr 10, 2025  {Provider  Link to SmartSet :535440}    {PROVIDER CHARTING PREFERENCE:267465}    Dominic Loyola is a 39 year old, presenting for the following:  Establish Care and Physical        4/10/2025     2:45 PM   Additional Questions   Roomed by [facility]   Accompanied by spouse          HPI  ***   {MA/LPN/RN Pre-Provider Visit Orders- hCG/UA/Strep (Optional):583335}  {SUPERLIST (Optional):374707}  {additonal problems for provider to add (Optional):743165}  Advance Care Planning  Patient does not have a Health Care Directive: Discussed advance care planning with patient; information given to patient to review.      4/10/2025   General Health   How would you rate your overall physical health? Good   Feel stress (tense, anxious, or unable to sleep) Only a little   (!) STRESS CONCERN      4/10/2025   Nutrition   Three or more servings of calcium each day? Yes   Diet: Regular (no restrictions)   How many servings of fruit and vegetables per day? 4 or more   How many sweetened beverages each day? 0-1         4/10/2025   Exercise   Days per week of moderate/strenous exercise 4 days         4/10/2025   Social Factors   Frequency of gathering with friends or relatives Twice a week   Worry food won't last until get money to buy more No   Food not last or not have enough money for food? No   Do you have housing? (Housing is defined as stable permanent housing and does not include staying ouside in a car, in a tent, in an abandoned building, in an overnight shelter, or couch-surfing.) Yes   Are you worried about losing your housing? No   Lack of transportation? No   Unable to get utilities (heat,electricity)? No         4/10/2025   Dental   Dentist two times every year? (!) NO           Today's PHQ-2 Score:       4/10/2025     2:39 PM   PHQ-2 ( 1999 [employer])   Q1: Little interest or pleasure in doing things 0   Q2: Feeling  "down, depressed or hopeless 0   PHQ-2 Score 0    Q1: Little interest or pleasure in doing things Not at all   Q2: Feeling down, depressed or hopeless Not at all   PHQ-2 Score 0       Patient-reported           4/10/2025   Substance Use   Alcohol more than 3/day or more than 7/wk No   Do you use any other substances recreationally? No     Social History     Tobacco Use    Smoking status: Never     Passive exposure: Past    Smokeless tobacco: Never   Vaping Use    Vaping status: Never Used   Substance Use Topics    Alcohol use: Yes     Comment: 1 time a month or less    Drug use: No     {Provider  If there are gaps in the social history shown above, please follow the link to update and then refresh the note Link to Social and Substance History :240077}        4/10/2025   One time HIV Screening   Previous HIV test? No         4/10/2025   STI Screening   New sexual partner(s) since last STI/HIV test? No         4/10/2025   Contraception/Family Planning   Questions about contraception or family planning (!) YES ***     {Provider  REQUIRED FOR [facility] Use the storyboard to review patient history, after sections have been marked as reviewed, refresh note to capture documentation:230973}   Reviewed and updated as needed this visit by Provider                    {HISTORY OPTIONS (Optional):864805}    {ROS Picklists (Optional):199032}     Objective    Exam  /78   Pulse 120   Temp 98.5  F (36.9  C) (Tympanic)   Resp 20   Ht 1.721 m (5' 7.75\")   Wt 98.9 kg (218 lb)   SpO2 96%   BMI 33.39 kg/m     Estimated body mass index is 33.39 kg/m  as calculated from the following:    Height as of this encounter: 1.721 m (5' 7.75\").    Weight as of this encounter: 98.9 kg (218 lb).    Physical Exam  {Exam Choices (Optional):084979}        Signed Electronically by: Camelia Valente DO  {Email feedback regarding this note to [email or web address]   :926999}  " "STI/HIV test? No         4/10/2025   Contraception/Family Planning   Questions about contraception or family planning (!) YES - him and his wife have been planning/trying since 11/2023 (wife's IUD removed); just recently used ovulation kits.        Reviewed and updated as needed this visit by Provider   Tobacco  Allergies  Meds  Problems  Med Hx  Surg Hx  Fam Hx            Past Medical History:   Diagnosis Date    Periumbilical hernia 12/03/2015    Seizures (H)     as a baby     Past Surgical History:   Procedure Laterality Date    HERNIORRHAPHY UMBILICAL N/A 12/4/2015    Procedure: HERNIORRHAPHY UMBILICAL;  Surgeon: Mario Hughes MD;  Location: [facility] OR     OB History   No obstetric history on file.     BP Readings from Last 3 Encounters:   04/10/25 126/78   02/27/24 (!) 122/92   02/17/24 132/82    Wt Readings from Last 3 Encounters:   04/10/25 98.9 kg (218 lb)   02/27/24 96.3 kg (212 lb 4.8 oz)   02/17/24 96.1 kg (211 lb 12.8 oz)                  Patient Active Problem List   Diagnosis    Seizure in infant (H)     Past Surgical History:   Procedure Laterality Date    HERNIORRHAPHY UMBILICAL N/A 12/4/2015    Procedure: HERNIORRHAPHY UMBILICAL;  Surgeon: Mario Hughse MD;  Location:  OR       Social History     Tobacco Use    Smoking status: Never     Passive exposure: Past    Smokeless tobacco: Never   Substance Use Topics    Alcohol use: Yes     Comment: 1 time a month or less     Family History   Problem Relation Age of Onset    Autoimmune Disease Father         ANCA vasculitis         Current Outpatient Medications   Medication Sig Dispense Refill    FLUoxetine (PROZAC) 40 MG capsule Take 40 mg by mouth daily      VITAMIN D PO Take by mouth.       Allergies   Allergen Reactions    Doxycycline Itching        Objective    Exam  /78   Pulse 120   Temp 98.5  F (36.9  C) (Tympanic)   Resp 20   Ht 1.721 m (5' 7.75\")   Wt 98.9 kg (218 lb)   SpO2 96%   BMI 33.39 kg/m     Estimated body " "mass index is 33.39 kg/m  as calculated from the following:    Height as of this encounter: 1.721 m (5' 7.75\").    Weight as of this encounter: 98.9 kg (218 lb).    Physical Exam  GENERAL: alert and no distress  EYES: Eyes grossly normal to inspection, PERRL and conjunctivae and sclerae normal  NECK: no adenopathy, no asymmetry, masses, or scars  RESP: lungs clear to auscultation - no rales, rhonchi or wheezes  CV: regular rate and rhythm, normal S1 S2, no S3 or S4, no murmur, click or rub, no peripheral edema  ABDOMEN: soft, nontender, no hepatosplenomegaly, no masses and bowel sounds normal  MS: no gross musculoskeletal defects noted, no edema  SKIN: no suspicious lesions or rashes  PSYCH: mentation appears normal, affect normal/bright    Signed Electronically by: Camelia Valente,     "

## 2025-04-10 NOTE — NURSING NOTE
"Chief Complaint   Patient presents with    Establish Care    Physical       Initial /78   Pulse 120   Temp 98.5  F (36.9  C) (Tympanic)   Resp 20   Ht 1.721 m (5' 7.75\")   Wt 98.9 kg (218 lb)   SpO2 96%   BMI 33.39 kg/m   Estimated body mass index is 33.39 kg/m  as calculated from the following:    Height as of this encounter: 1.721 m (5' 7.75\").    Weight as of this encounter: 98.9 kg (218 lb).  Medication Review: complete    The next two questions are to help us understand your food security.  If you are feeling you need any assistance in this area, we have resources available to support you today.          4/10/2025   SDOH- Food Insecurity   Within the past 12 months, did you worry that your food would run out before you got money to buy more? N   Within the past 12 months, did the food you bought just not last and you didn t have money to get more? N         Health Care Directive:  Patient does not have a Health Care Directive: Discussed advance care planning with patient; information given to patient to review.    Em Gandhi LPN      "

## 2025-04-11 LAB — VIT D+METAB SERPL-MCNC: 22 NG/ML (ref 20–50)

## 2025-05-30 ENCOUNTER — TELEPHONE (OUTPATIENT)
Dept: FAMILY MEDICINE | Facility: OTHER | Age: 40
End: 2025-05-30
Payer: COMMERCIAL

## 2025-05-30 DIAGNOSIS — F32.A DEPRESSION, UNSPECIFIED DEPRESSION TYPE: Primary | ICD-10-CM

## 2025-05-30 RX ORDER — FLUOXETINE HYDROCHLORIDE 40 MG/1
40 CAPSULE ORAL DAILY
Qty: 90 CAPSULE | Refills: 4 | Status: SHIPPED | OUTPATIENT
Start: 2025-05-30

## 2025-05-30 NOTE — TELEPHONE ENCOUNTER
The patient would like to talk about his fluoxetine.  Wondering if he could have Camelia Valente order it for him.  He has to pay for psychiatry and would like to entertain Camelia Valente ordering this for him this next refill . Please advise him.

## 2025-05-30 NOTE — TELEPHONE ENCOUNTER
Patient states he has to see psychiatry in clinic in order to get refills. He is looking to not have to pay for an office visit in order to get refills. Writer informed patient our policy is that he'd have to follow up in clinic as these types of medications require a follow up every 6 months. Patient states he was just seen by Dr. Valente 4/10/25. Would that count as an appointment for it? Will Dr. Valente take over his Fluoxetine?     Estrellita Espinosa CMA on 5/30/2025 at 2:55 PM

## 2025-05-31 NOTE — TELEPHONE ENCOUNTER
Called patient and informed him of prescription at pharmacy with refills and that Dr. Valente would take over prescription.    Ana Hanson LPN on 5/31/2025 at 1:27 PM